# Patient Record
Sex: MALE | NOT HISPANIC OR LATINO
[De-identification: names, ages, dates, MRNs, and addresses within clinical notes are randomized per-mention and may not be internally consistent; named-entity substitution may affect disease eponyms.]

---

## 2017-06-02 ENCOUNTER — CHARTING TRANS (OUTPATIENT)
Dept: OTHER | Age: 52
End: 2017-06-02

## 2018-02-09 ENCOUNTER — APPOINTMENT (OUTPATIENT)
Dept: GENERAL RADIOLOGY | Age: 53
End: 2018-02-09
Attending: EMERGENCY MEDICINE

## 2018-02-09 ENCOUNTER — HOSPITAL ENCOUNTER (OUTPATIENT)
Age: 53
Discharge: HOME | End: 2018-02-09
Attending: EMERGENCY MEDICINE

## 2018-02-09 VITALS
TEMPERATURE: 98 F | OXYGEN SATURATION: 96 % | DIASTOLIC BLOOD PRESSURE: 82 MMHG | WEIGHT: 185 LBS | SYSTOLIC BLOOD PRESSURE: 150 MMHG | HEIGHT: 68 IN | BODY MASS INDEX: 28.04 KG/M2 | HEART RATE: 63 BPM | RESPIRATION RATE: 18 BRPM

## 2018-02-09 DIAGNOSIS — S46.911A RIGHT SHOULDER STRAIN, INITIAL ENCOUNTER: Primary | ICD-10-CM

## 2018-02-09 DIAGNOSIS — S60.221A CONTUSION OF RIGHT HAND, INITIAL ENCOUNTER: ICD-10-CM

## 2018-02-09 PROCEDURE — 99214 OFFICE O/P EST MOD 30 MIN: CPT

## 2018-02-09 PROCEDURE — 73130 X-RAY EXAM OF HAND: CPT | Performed by: EMERGENCY MEDICINE

## 2018-02-09 PROCEDURE — 73030 X-RAY EXAM OF SHOULDER: CPT | Performed by: EMERGENCY MEDICINE

## 2018-02-09 NOTE — ED PROVIDER NOTES
Patient Seen in: [address]    History   Patient presents with:  Shoulder Pain  Hand Pain    Stated Complaint: FELL/RT SIDE INJURY    HPI  Patient states 2 days ago he slipped and fell on his right side when he was out sn Current:/82   Pulse 63   Temp 97.6 °F (36.4 °C) (Oral)   Resp 18   Ht 172.7 cm (5' 8\")   Wt 83.9 kg   SpO2 96%   BMI 28.13 kg/m²         Physical Exam   Constitutional: He is oriented to person, place, and time.  He appears well-developed and well-no Impression:    CONCLUSION: Normal examination.                 XR HAND (MIN 3 VIEWS), RIGHT (CPT=73130) (Final result)   Result time 02/09/18 08:54:09   Final result by Heather Watson MD (02/09/18 08:54:09)                Impression:    Mely Brown

## 2018-02-09 NOTE — ED INITIAL ASSESSMENT (HPI)
Pt with pain to right shoulder and right  hand after falling on a patch of ice on Wednesday evening. Dr Chase Reid at bedside.

## 2018-02-13 ENCOUNTER — OFFICE VISIT (OUTPATIENT)
Dept: INTERNAL MEDICINE CLINIC | Facility: CLINIC | Age: 53
End: 2018-02-13

## 2018-02-13 VITALS
HEART RATE: 69 BPM | WEIGHT: 182 LBS | RESPIRATION RATE: 20 BRPM | DIASTOLIC BLOOD PRESSURE: 71 MMHG | SYSTOLIC BLOOD PRESSURE: 112 MMHG | BODY MASS INDEX: 28.56 KG/M2 | HEIGHT: 67 IN | TEMPERATURE: 97 F

## 2018-02-13 DIAGNOSIS — R56.9 SEIZURE (HCC): ICD-10-CM

## 2018-02-13 DIAGNOSIS — M25.511 RIGHT SHOULDER PAIN, UNSPECIFIED CHRONICITY: Primary | ICD-10-CM

## 2018-02-13 PROCEDURE — 99214 OFFICE O/P EST MOD 30 MIN: CPT | Performed by: INTERNAL MEDICINE

## 2018-02-13 PROCEDURE — 99212 OFFICE O/P EST SF 10 MIN: CPT | Performed by: INTERNAL MEDICINE

## 2018-02-13 NOTE — PROGRESS NOTES
HPI:    Patient ID: Amanda Smyth is a 47year old male.     Fall   Incident onset: last   [de-identified] -   5  days ago  fall on ice  outside and  hurt    right   arm and   r shoulder  -    patient  was in  [facility] -    Xr  R  shoulder  and arm  was   normal  no  fractu as needed for Itching. Disp:  Rfl:      Allergies:  Pcn [Penicillamine]     Unknown   PHYSICAL EXAM:   Physical Exam   Constitutional: He is oriented to person, place, and time. He appears well-developed and well-nourished. No distress.    HENT:   Head: Nor For  eval  And th   Xr   Right   Shoulder  - neg   fracture   Light  Duties  For  1  Week  Starting   On 2 /19 /18  Than  Per Ortho    CONCLUSION:  XR  Hand   Mild degenerative change. No acute osseous abnormality in the right hand.      Xr   R shoulder -

## 2018-02-23 ENCOUNTER — OFFICE VISIT (OUTPATIENT)
Dept: ORTHOPEDICS CLINIC | Facility: CLINIC | Age: 53
End: 2018-02-23

## 2018-02-23 DIAGNOSIS — M75.21 BICEPS TENDONITIS ON RIGHT: Primary | ICD-10-CM

## 2018-02-23 PROCEDURE — 99212 OFFICE O/P EST SF 10 MIN: CPT | Performed by: ORTHOPAEDIC SURGERY

## 2018-02-23 PROCEDURE — 99203 OFFICE O/P NEW LOW 30 MIN: CPT | Performed by: ORTHOPAEDIC SURGERY

## 2018-02-23 NOTE — PROGRESS NOTES
2/23/2018  ta Ground  5/10/1965  47year old   male  Shiva Goodson MD    HPI:   Patient presents with:  Shoulder Pain: Right - onset 1.5 weeks ago when he slipped on ice and fell on his R arm - was in [facility] in [place] and has x-rays in the sy positives and negatives listed in the HPI. EXAM:     The patient is awake and oriented x 3 and overall well appearing. The patient has normal mood. The patient is non-tender and atraumatic with the exception of their right upper extremity.   The patien

## 2018-04-17 ENCOUNTER — TELEPHONE (OUTPATIENT)
Dept: INTERNAL MEDICINE CLINIC | Facility: CLINIC | Age: 53
End: 2018-04-17

## 2018-04-17 NOTE — TELEPHONE ENCOUNTER
Attempted to contact the pt but was unable to leave message. Will wait for pt to return call. Please send to Michelle Perez at [phone] until 6 pm today.

## 2018-04-17 NOTE — TELEPHONE ENCOUNTER
PT stts he would like a note to be released from astrictions at his job. PT stts he needs not asap.  Please advise

## 2018-04-19 NOTE — TELEPHONE ENCOUNTER
Message was left on voicemail that the restriction from PCP was only from 02/19-02/26 and if any other restrictions was to be given they was to come from the ortho MD. Pt was asked to contact the ortho MD that was seen and ask for restrictions to be remove

## (undated) NOTE — LETTER
[facility] [place] [address] [facility] [address]  Dept: [phone]  Dept Fax: [phone]  Loc: [phone]      February 9, 2018    Patient: Vitor Gotti   Date of Visit: 2/9/2018       To Whom It May Concern:    Akhil Dent

## (undated) NOTE — LETTER
[facility] [place] [address]  [address] [facility] [address]  Dept: [phone]  Dept Fax: [phone]  Loc: [phone]      February 9, 2018    Patient: Robert Lemons   Date of Visit: 2/9/2018       To Whom It May Concern:    Feng Garcia

## (undated) NOTE — LETTER
2/13/2018          To Whom It May Concern:    Ashley Green was seen in the office. He can continue  to work with these limitations: No lifting more than 10 pounds, no reaching overhead, no repetitive movements with the right hand.   These restrictions agnes